# Patient Record
Sex: FEMALE | Race: WHITE | NOT HISPANIC OR LATINO | URBAN - METROPOLITAN AREA
[De-identification: names, ages, dates, MRNs, and addresses within clinical notes are randomized per-mention and may not be internally consistent; named-entity substitution may affect disease eponyms.]

---

## 2021-04-04 ENCOUNTER — EMERGENCY (EMERGENCY)
Facility: HOSPITAL | Age: 71
LOS: 1 days | Discharge: HOME | End: 2021-04-04
Attending: STUDENT IN AN ORGANIZED HEALTH CARE EDUCATION/TRAINING PROGRAM
Payer: COMMERCIAL

## 2021-04-04 VITALS
HEIGHT: 63 IN | RESPIRATION RATE: 20 BRPM | TEMPERATURE: 98 F | HEART RATE: 75 BPM | WEIGHT: 119.93 LBS | SYSTOLIC BLOOD PRESSURE: 175 MMHG | OXYGEN SATURATION: 97 % | DIASTOLIC BLOOD PRESSURE: 84 MMHG

## 2021-04-04 VITALS
HEART RATE: 93 BPM | DIASTOLIC BLOOD PRESSURE: 76 MMHG | OXYGEN SATURATION: 100 % | SYSTOLIC BLOOD PRESSURE: 167 MMHG | RESPIRATION RATE: 20 BRPM

## 2021-04-04 PROCEDURE — 99283 EMERGENCY DEPT VISIT LOW MDM: CPT

## 2021-04-04 PROCEDURE — 99284 EMERGENCY DEPT VISIT MOD MDM: CPT

## 2021-04-04 NOTE — ED ADULT NURSE NOTE - OBJECTIVE STATEMENT
Pt is a 71y/o female A&Ox3 speaking coherently ambulates independently BIBEMS w/ c/o L eye disturbances that started around 2300 tonight. Pt describes it as moving black dots and "flashing," L eye only. Pt denies any hx HTN. Only medical hx Is hypothyroidism (on synthroid) and sx last May for melanoma. Denies headache, chest pain, shortness of breath, abdominal pain, nausea, vomiting, diarrhea, fevers, chills, dysuria, hematuria, recent illness travel or fall. Safety and comfort measures provided. Bed locked and in lowest position, side rails up for safety. Call bell within reach.

## 2021-04-04 NOTE — ED ADULT NURSE NOTE - CAS TRG GEN SKIN COLOR
----- Message from Renée Venegas sent at 4/3/2017 12:20 PM CDT -----  Contact: Videt/wife  Patient request a call back at [phone], Regards to his prescription.    Thanks  Td     Normal for race

## 2021-04-04 NOTE — ED PROVIDER NOTE - PROGRESS NOTE DETAILS
Resident Abbe: Spoke with Ophtho resident (Bakari Cook MD) - evaluation is consistent with Vitreous Hem/Detach - does not need emergent dilated exam overnight, but if patient is amenable to being seen in AM in ED can be seen, otherwise if patient feels comfortable can be seen by Ophtho in clinic tomorrow. Evaluation is NOT consistent with AAG, TIA, Retinal Detch. Resident Abbe: Bakari Cook (Ophto) had an extensive conversation with patient (Sabbath accomodated by using my cellphone on speaker) as she is observing the Holiday and would like to go home instead of staying for Ophto eval in the AM. Pt has been given [facility] number and agrees to  phone as exception to Sabbath to be seen in clinic tomorrow afternoon. Pt's cell phone number given to [facility] resident. Pt discharged, to be seen in clinic tomorrow. FOR THE [facility] RESIDENT: PATIENTS HOME NUMBER: [phone]; CELL PHONE NUMBER: [phone]. CALL HOUSE NUMBER TOMORROW.

## 2021-04-04 NOTE — ED PROVIDER NOTE - NS ED ROS FT
Constitution: No Fever or chills, No Weight Loss,   HEENT: (+) L Eye Flashes; No Eye Pain, No Blurry Vision; No cough, No Discharge, No Rhinorrhea, No URI symptoms  Cardio: No Chest pain, No Palpitations, No Dyspnea  Resp: No SOB, No Wheezing  GI: No abdominal pain, No Nausea, No Vomiting, No Constipation, No Diarrhea  : No burning upon urination, trouble urinating, no foul odor from urine  MSK: No Back pain, No Numbness, No Tingling, No Weakness  Neuro: No Headache, No changes to Vision, No changes to Hearing, Normal Gait  Skin: No rashes, No Bruising, No Swelling

## 2021-04-04 NOTE — ED PROVIDER NOTE - ATTENDING CONTRIBUTION TO CARE
I have personally performed a face to face medical and diagnostic evaluation of the patient. I have discussed with and reviewed the Resident's note and agree with the History, ROS, Physical Exam and MDM unless otherwise indicated. A brief summary of my personal evaluation and impression can be found below.    70F hx of hypothyroid presents w a cc of acute onset small light "flashes" and streaks in L eye, improved since initial onset reports started to develop them while reading no trauma no pain no curtail falling at no point was there complete vision loss or visual deficit per pt, denies cloudy vision pain, no pain w EOM no headache no fever no eye discharge, light does not bother her, reports small area of dark with "legs like a spider" that intermittent move but are not related to her eye movements. Denies n/v/f/c/cp/sob. Denies headache, syncope, lightheadedness, dizziness. Denies chest palpitations, abdominal pain. Denies edema. Denies dysuria, hematuria, BRBPR, tarry stools, diarrhea, constipation.    All other ROS negative, except as above and as per HPI and ROS section.    VITALS: Initial triage and subsequent vitals have been reviewed by me.  GEN APPEARANCE: WDWN, alert, non-toxic, NAD  HEAD: Atraumatic.  EYES: PERRLa, EOMI, vision grossly intact.   NECK: Supple  CV: RRR, S1S2, no c/r/m/g. Cap refill <2 seconds. No bruits.   LUNGS: CTAB. No abnormal breath sounds.  ABDOMEN: Soft, NTND. No guarding or rebound.   MSK/EXT: No spinal or extremity point tenderness. No CVA ttp. Pelvis stable. No peripheral edema.  NEURO: Alert, follows commands. Weight bearing normal. Speech normal. Sensation and motor normal x4 extremities. CN2-12 normal, coordination normal, ambulating normally.  SKIN: Warm, dry and intact. No rash.  PSYCH: Appropriate    Plan/MDM: 70F presents w acute onset but improving atraumatic non painful vision changes w/o complete vision loss no falling curtain no headache, exam vss non toxic non focal exam ddx c/f vit hemorrhage vs detach less c/f acute glaucoma/temp arteritis given no pain, exam and history not c/w tia as no fnd appreciated and no complete vision loss, after joint discussion with optho and pt speaking with riky herself, bc of holiday pt was offered optho eval in house however at this time declines and prefers to see riky outpt, we have helped arrange for her to have urgent optho eval in clinic tomorrow, pt reports understanding will return to optho clinic tomorrow strict return precautions discussed for worsening / continuing sx pt to return to ED should sx worsen.

## 2021-04-04 NOTE — ED PROVIDER NOTE - CLINICAL SUMMARY MEDICAL DECISION MAKING FREE TEXT BOX
70 female, no history. Presents with L Flashes, no eye pain, no eye trauma, no loss of vision or blurry vision. evaluation is consistent with Vitreous Hem/Detach - as per Ophto (Bakari Cook MD) does not need emergent dilated exam overnight, but if patient is amenable to being seen in AM in ED can be seen, otherwise if patient feels comfortable can be seen by Ophtho in clinic tomorrow. Evaluation is NOT consistent with AAG, TIA, Retinal Detch.

## 2021-04-04 NOTE — ED PROVIDER NOTE - PHYSICAL EXAMINATION
GEN - NAD; non-toxic; A+Ox3, speaking full sentences, steady gait   HENT - NC/AT, No visible Ecchymosis, No Abrasions, No Lac/Tears, MMM, no discharge  EYES - EOMI, PERRL, 20/30 OD/OS; Non-proptotic pupils, non-injected; no conjunctival pallor, no scleral icterus  PULM - CTA B/L,  symmetric breath sounds  CV -  RRR, S1 S2, no murmurs 2+ Pulses B/L UE  GI - NT/ND, soft, no guarding, no rebound, no masses    MSK/EXT- no edema, no gross deformity, warm and well perfused, no calf tenderness/swelling/erythema   NEUROLOGIC - alert, CN2-12 intact, sensation intact, moving all 4 ext with 5/5 Strength

## 2021-04-04 NOTE — ED PROVIDER NOTE - NSFOLLOWUPINSTRUCTIONS_ED_ALL_ED_FT
You were seen and evaluated in the Emergency Department for your L eye flashes. You were evaluated clinically by an Emergency Medicine Physician and offered emergency evaluation by an Ophthalmologic Consulting Physician for which you agreed to be seen in the clinic tomorrow afternoon.     At this time your clinical evaluation and history do not demonstrate any acute, life-threatening medical conditions warranting emergent treatment. However, we strongly recommend you follow up with one of our Ophthalmology consultants (or your own) for further evaluation of your symptoms by walking into the clinic tomorrow:    [facility]  Ophthalmology  [address] [place] [address], [address]  [place] [address]  Phone: [phone]; [phone]  APPOINTMENT TIME: 4:00 PM    Should you develop new or worsening eye pain, blurry vision, black out of the eye, vision loss, pain with eye movement, fevers, chills, nausea, vomiting - please return to the ED for immediate evaluation.     We also strongly encourage you make an appointment with your Primary Care Physician for a comprehensive evaluation of your health.

## 2021-04-04 NOTE — ED PROVIDER NOTE - OBJECTIVE STATEMENT
70 female, Hx: Hypothyroid - presents with acute onset of "flashes/streaks" in her L eye at 23:00 last evening. Denies any eye pain, blurry vision, curtain vision loss, eye redness. Denies any headache, neck pain, numbness/tingling/weakness in peripheral extremities, denies any dysarthria, dysphagia. Flashes were intermittently worse around 23:00 but now resolving.

## 2021-04-04 NOTE — ED PROVIDER NOTE - PATIENT PORTAL LINK FT
You can access the FollowMyHealth Patient Portal offered by [facility] by registering at the following website: [email or web address]. By joining [employer]’s FollowMyHealth portal, you will also be able to view your health information using other applications (apps) compatible with our system.

## 2023-08-25 NOTE — ED ADULT TRIAGE NOTE - SPO2 (%)
97 Topical Retinoid counseling:  Patient advised to apply a pea-sized amount only at bedtime and wait 30 minutes after washing their face before applying.  If too drying, patient may add a non-comedogenic moisturizer. The patient verbalized understanding of the proper use and possible adverse effects of retinoids.  All of the patient's questions and concerns were addressed.

## 2024-10-10 ENCOUNTER — APPOINTMENT (OUTPATIENT)
Dept: OTOLARYNGOLOGY | Facility: CLINIC | Age: 74
End: 2024-10-10
Payer: COMMERCIAL

## 2024-10-10 VITALS
OXYGEN SATURATION: 96 % | HEART RATE: 75 BPM | BODY MASS INDEX: 21.44 KG/M2 | DIASTOLIC BLOOD PRESSURE: 97 MMHG | SYSTOLIC BLOOD PRESSURE: 157 MMHG | WEIGHT: 121 LBS | HEIGHT: 63 IN

## 2024-10-10 DIAGNOSIS — H93.8X2 OTHER SPECIFIED DISORDERS OF LEFT EAR: ICD-10-CM

## 2024-10-10 DIAGNOSIS — H61.23 IMPACTED CERUMEN, BILATERAL: ICD-10-CM

## 2024-10-10 PROCEDURE — 99203 OFFICE O/P NEW LOW 30 MIN: CPT | Mod: 25

## 2024-10-10 PROCEDURE — 69210 REMOVE IMPACTED EAR WAX UNI: CPT

## 2024-10-10 RX ORDER — OFLOXACIN OTIC 3 MG/ML
0.3 SOLUTION AURICULAR (OTIC)
Qty: 1 | Refills: 0 | Status: ACTIVE | COMMUNITY
Start: 2024-10-10 | End: 1900-01-01